# Patient Record
Sex: FEMALE | Race: WHITE | NOT HISPANIC OR LATINO | URBAN - METROPOLITAN AREA
[De-identification: names, ages, dates, MRNs, and addresses within clinical notes are randomized per-mention and may not be internally consistent; named-entity substitution may affect disease eponyms.]

---

## 2024-02-13 SDOH — SOCIAL STABILITY: SOCIAL NETWORK

## 2024-02-13 SDOH — SOCIAL STABILITY: SOCIAL NETWORK: PROMIS ABILITY TO PARTICIPATE IN SOCIAL ROLES & ACTIVITIES T-SCORE: 36

## 2024-02-13 SDOH — SOCIAL STABILITY: SOCIAL NETWORK: I HAVE TROUBLE DOING ALL OF THE ACTIVITIES WITH FRIENDS THAT I WANT TO DO: SOMETIMES

## 2024-02-13 SDOH — SOCIAL STABILITY: SOCIAL NETWORK: I HAVE TROUBLE DOING ALL OF MY USUAL WORK (INCLUDE WORK AT HOME): ALWAYS

## 2024-02-13 SDOH — ECONOMIC STABILITY: FOOD INSECURITY: WITHIN THE PAST 12 MONTHS, THE FOOD YOU BOUGHT JUST DIDN'T LAST AND YOU DIDN'T HAVE MONEY TO GET MORE.: NEVER TRUE

## 2024-02-13 SDOH — ECONOMIC STABILITY: FOOD INSECURITY: WITHIN THE PAST 12 MONTHS, YOU WORRIED THAT YOUR FOOD WOULD RUN OUT BEFORE YOU GOT MONEY TO BUY MORE.: NEVER TRUE

## 2024-02-13 SDOH — SOCIAL STABILITY: SOCIAL NETWORK: I HAVE TROUBLE DOING ALL OF THE FAMILY ACTIVITIES THAT I WANT TO DO: ALWAYS

## 2024-02-13 SDOH — SOCIAL STABILITY: SOCIAL NETWORK: I HAVE TROUBLE DOING ALL OF MY REGULAR LEISURE ACTIVITIES WITH OTHERS: ALWAYS

## 2024-02-13 ASSESSMENT — ANXIETY QUESTIONNAIRES
PAST MONTH HOW OFTEN HAVE YOU FELT THAT YOU WERE UNABLE TO CONTROL THE IMPORTANT THINGS IN YOUR LIFE: 4 - VERY OFTEN
PAST MONTH HOW OFTEN HAVE YOU FELT THAT THINGS WERE GOING YOUR WAY: 3 - FAIRLY OFTEN
PAST MONTH HOW OFTEN HAVE YOU FELT CONFIDENT ABOUT YOUR ABILITY TO HANDLE YOUR PROBLEMS: 4 - VERY OFTEN
PAST MONTH HOW OFTEN HAVE YOU FELT CONFIDENT ABOUT YOUR ABILITY TO HANDLE YOUR PROBLEMS: 4 - VERY OFTEN
PAST MONTH HOW OFTEN HAVE YOU FELT THAT YOU WERE UNABLE TO CONTROL THE IMPORTANT THINGS IN YOUR LIFE: 4 - VERY OFTEN
PAST MONTH HOW OFTEN HAVE YOU FELT DIFFICULTIES WERE PILING UP SO HIGH THAT YOU COULD NOT OVERCOME THEM: 4 - VERY OFTEN

## 2024-02-13 ASSESSMENT — PROMIS GLOBAL HEALTH SCALE
RATE_SOCIAL_SATISFACTION: GOOD
CARRYOUT_SOCIAL_ACTIVITIES: FAIR
RATE_AVERAGE_FATIGUE: SEVERE
RATE_QUALITY_OF_LIFE: FAIR
EMOTIONAL_PROBLEMS: RARELY
CARRYOUT_PHYSICAL_ACTIVITIES: A LITTLE
RATE_PHYSICAL_HEALTH: POOR
RATE_MENTAL_HEALTH: VERY GOOD
RATE_AVERAGE_PAIN: 8
RATE_GENERAL_HEALTH: FAIR

## 2024-02-15 ENCOUNTER — ALLIED HEALTH (OUTPATIENT)
Dept: INTEGRATIVE MEDICINE | Facility: CLINIC | Age: 89
End: 2024-02-15
Payer: MEDICARE

## 2024-02-15 DIAGNOSIS — M54.59 OTHER LOW BACK PAIN: Primary | ICD-10-CM

## 2024-02-15 PROCEDURE — 99202 OFFICE O/P NEW SF 15 MIN: CPT | Performed by: ACUPUNCTURIST

## 2024-02-15 PROCEDURE — 97810 ACUP 1/> WO ESTIM 1ST 15 MIN: CPT | Performed by: ACUPUNCTURIST

## 2024-02-15 ASSESSMENT — ENCOUNTER SYMPTOMS: BACK PAIN: 1

## 2024-02-15 NOTE — PROGRESS NOTES
Acupuncture Visit:     Subjective   Patient ID: Bethanie Finney is a 89 y.o. female who presents for Back Pain  Pt with daughter.  Pt reporting stiffness and soreness with movement and improves with rest.  It does not disturb sleep.  She does not experience sharp shooting pain.  She takes tylenol daily to help as well as heating pad.  The pain spans from neck down to low back.  She also suffers from constipation.  She tried to have BM every day but it is labored.  She reports prolapse of organs and believes that it is an active contributor to constipation.  She also reports bilateral neuropathy and weakness of legs.  Denies diabetes.   Sometimes she feels off balance when she is standing.  She also reports gas bloating, reflux.      Back Pain        Session Information  Is this acupuncture treatment being billed to the patient's insurance company: Yes  This is visit number: 1  The patient has a total number of visits of: 12  Initial Acupuncture Treatment date: 02/15/24  Name of Insurance Company: [employer]age  Name of Supervising Physician: NA  Visit Type: New patient         Review of Systems   Musculoskeletal:  Positive for back pain.            Provider reviewed plan for the acupuncture session, precautions and contraindications. Patient/guardian/hospital staff has given consent to treat with full understanding of what to expect during the session. Before acupuncture began, provider explained to the patient to communicate at any time if the procedure was causing discomfort past their tolerance level. Patient agreed to advise acupuncturist. The acupuncturist counseled the patient on the risks of acupuncture treatment including pain, infection, bleeding, and no relief of pain. The patient was positioned comfortably. There was no evidence of infection at the site of needle insertions.    Objective   Physical Exam         Treatment Plan  Treatment Goals: Pain management  Pattern Differentiation: FR in CORNELIO  channel, adrenal imbalance, st qi deficiency  Treatment Principle: move qi and blood, regulate adrenals and WARREN channel    Acupuncture Treatment  Patient Position: Lateral recumbent on a table  Needle Guage: 42 guage /.14/ Lime green seirin, 40 guage /.16/ Red seirin, 36 guage /.20/ Blue seirin  Body Points: With retention  Body Points - Left: st  qi x2, warren 5, 8  Body Points - Bilateral: HJJ L 2,3,4 Ub 20, 23  Body Points - Right: k 7, 10,  Other Techniques Utilized: TDP Lamp, Topicals  Topicals Description: warming lotion with orange peppermint  TDP Lamp Descripton: armani  Needle Count In: 16  Needle Count Out: 16  Needle Retention Time (min): 25 minutes  Total Face to Face Time (min): 25 minutes              Assessment/Plan

## 2024-02-15 NOTE — PATIENT INSTRUCTIONS
Pt tolerated tx well.  Immediate pt reported benefits of reported no numbness in feet and decreased pain in back.  Advised to engage in gentle activity following tx and hydrate.  Schedule 4-6 follow tx with re-evaluation

## 2024-02-29 ENCOUNTER — ALLIED HEALTH (OUTPATIENT)
Dept: INTEGRATIVE MEDICINE | Facility: CLINIC | Age: 89
End: 2024-02-29
Payer: MEDICARE

## 2024-02-29 DIAGNOSIS — M54.59 OTHER LOW BACK PAIN: Primary | ICD-10-CM

## 2024-02-29 PROCEDURE — 97811 ACUP 1/> W/O ESTIM EA ADD 15: CPT | Performed by: ACUPUNCTURIST

## 2024-02-29 PROCEDURE — 97810 ACUP 1/> WO ESTIM 1ST 15 MIN: CPT | Performed by: ACUPUNCTURIST

## 2024-02-29 ASSESSMENT — ENCOUNTER SYMPTOMS: BACK PAIN: 1

## 2024-02-29 NOTE — PROGRESS NOTES
Acupuncture Visit:     Subjective   Patient ID: Bethanie Finney is a 89 y.o. female who presents for Back Pain  Immediate pt reported benefits of reported no numbness in feet and decreased pain in back lasted until the next the day and slowly returned to normal.  She is unsure how long it took to return to baseline.  She notes that if the sun shines she feels good and when weather is as unpredictable pain pattern gets aggravated.  Constipation has improved.  Numbness in feet has been alternating sides.  Gas bloating has improved.  She continues to feel week in limbs.      Previous:  Pt with daughter.  Pt reporting stiffness and soreness with movement and improves with rest.  It does not disturb sleep.  She does not experience sharp shooting pain.  She takes tylenol daily to help as well as heating pad.  The pain spans from neck down to low back.  She also suffers from constipation.  She tried to have BM every day but it is labored.  She reports prolapse of organs and believes that it is an active contributor to constipation.  She also reports bilateral neuropathy and weakness of legs.  Denies diabetes.   Sometimes she feels off balance when she is standing.  She also reports gas bloating, reflux.      Back Pain        Session Information  Is this acupuncture treatment being billed to the patient's insurance company: Yes  This is visit number: 2  The patient has a total number of visits of: 12  Initial Acupuncture Treatment date: 02/15/24  Name of Insurance Company: [employer]vantage  Name of Supervising Physician: SAVANNAH         Review of Systems   Musculoskeletal:  Positive for back pain.            Provider reviewed plan for the acupuncture session, precautions and contraindications. Patient/guardian/hospital staff has given consent to treat with full understanding of what to expect during the session. Before acupuncture began, provider explained to the patient to communicate at any time if the procedure was causing  discomfort past their tolerance level. Patient agreed to advise acupuncturist. The acupuncturist counseled the patient on the risks of acupuncture treatment including pain, infection, bleeding, and no relief of pain. The patient was positioned comfortably. There was no evidence of infection at the site of needle insertions.    Objective   Physical Exam         Treatment Plan  Treatment Goals: Pain management  Pattern Differentiation: adrenal imbalance  Treatment Principle: regulate adrenals    Acupuncture Treatment  Patient Position: Lateral recumbent on a table  Needle Guage: 42 guage /.14/ Lime green seirin, 40 guage /.16/ Red seirin  Body Points: With retention  Body Points - Left: ub 62, gb 39  Body Points - Bilateral: bafeng, sp3.2, HJJ l2,3,4, ub 22, 23, 24  Body Points - Right: si 3, du 2.5, 14, 20, k7  Other Techniques Utilized: TDP Lamp, Topicals  Topicals Description: warming lotion with orange peppermint  TDP Lamp Descripton: armani  Needle Count In: 29  Needle Count Out: 29  Needle Retention Time (min): 25 minutes  Total Face to Face Time (min): 25 minutes              Assessment/Plan

## 2024-02-29 NOTE — PATIENT INSTRUCTIONS
Pt tolerated tx well.  Immediate pt reported benefits of decreased numbness in left foot and bilateral looseness and no pain in low back.  Advised to engage in gentle activity following tx and hydrate.  Schedule 4-6 follow tx with re-evaluation

## 2024-03-07 ENCOUNTER — APPOINTMENT (OUTPATIENT)
Dept: INTEGRATIVE MEDICINE | Facility: CLINIC | Age: 89
End: 2024-03-07
Payer: MEDICARE

## 2024-03-12 ENCOUNTER — APPOINTMENT (OUTPATIENT)
Dept: INTEGRATIVE MEDICINE | Facility: CLINIC | Age: 89
End: 2024-03-12
Payer: MEDICARE

## 2024-03-19 ENCOUNTER — APPOINTMENT (OUTPATIENT)
Dept: INTEGRATIVE MEDICINE | Facility: CLINIC | Age: 89
End: 2024-03-19
Payer: MEDICARE

## 2024-03-26 ENCOUNTER — APPOINTMENT (OUTPATIENT)
Dept: INTEGRATIVE MEDICINE | Facility: CLINIC | Age: 89
End: 2024-03-26
Payer: MEDICARE